# Patient Record
Sex: FEMALE | Race: BLACK OR AFRICAN AMERICAN | NOT HISPANIC OR LATINO | Employment: FULL TIME | URBAN - METROPOLITAN AREA
[De-identification: names, ages, dates, MRNs, and addresses within clinical notes are randomized per-mention and may not be internally consistent; named-entity substitution may affect disease eponyms.]

---

## 2024-02-13 LAB — BCS RECOMMENDATION EXT: NORMAL

## 2024-03-12 ENCOUNTER — OFFICE VISIT (OUTPATIENT)
Dept: OBSTETRICS AND GYNECOLOGY | Facility: CLINIC | Age: 39
End: 2024-03-12
Payer: COMMERCIAL

## 2024-03-12 VITALS
SYSTOLIC BLOOD PRESSURE: 128 MMHG | DIASTOLIC BLOOD PRESSURE: 88 MMHG | BODY MASS INDEX: 47.46 KG/M2 | HEIGHT: 64 IN | WEIGHT: 278 LBS

## 2024-03-12 DIAGNOSIS — N39.3 SUI (STRESS URINARY INCONTINENCE, FEMALE): ICD-10-CM

## 2024-03-12 DIAGNOSIS — Z01.419 ENCOUNTER FOR ANNUAL ROUTINE GYNECOLOGICAL EXAMINATION: Primary | ICD-10-CM

## 2024-03-12 DIAGNOSIS — Z12.4 CERVICAL CANCER SCREENING: ICD-10-CM

## 2024-03-12 PROCEDURE — 88175 CYTOPATH C/V AUTO FLUID REDO: CPT | Performed by: OBSTETRICS & GYNECOLOGY

## 2024-03-12 PROCEDURE — 99386 PREV VISIT NEW AGE 40-64: CPT | Mod: S$GLB,,, | Performed by: OBSTETRICS & GYNECOLOGY

## 2024-03-12 PROCEDURE — 3008F BODY MASS INDEX DOCD: CPT | Mod: CPTII,S$GLB,, | Performed by: OBSTETRICS & GYNECOLOGY

## 2024-03-12 PROCEDURE — 87624 HPV HI-RISK TYP POOLED RSLT: CPT | Performed by: OBSTETRICS & GYNECOLOGY

## 2024-03-12 PROCEDURE — 3074F SYST BP LT 130 MM HG: CPT | Mod: CPTII,S$GLB,, | Performed by: OBSTETRICS & GYNECOLOGY

## 2024-03-12 PROCEDURE — 3079F DIAST BP 80-89 MM HG: CPT | Mod: CPTII,S$GLB,, | Performed by: OBSTETRICS & GYNECOLOGY

## 2024-03-12 PROCEDURE — 1159F MED LIST DOCD IN RCRD: CPT | Mod: CPTII,S$GLB,, | Performed by: OBSTETRICS & GYNECOLOGY

## 2024-03-12 RX ORDER — DULAGLUTIDE 0.75 MG/.5ML
INJECTION, SOLUTION SUBCUTANEOUS
COMMUNITY
Start: 2024-02-22

## 2024-03-12 RX ORDER — ERGOCALCIFEROL 1.25 MG/1
50000 CAPSULE ORAL
COMMUNITY
Start: 2024-02-19

## 2024-03-12 RX ORDER — METOPROLOL SUCCINATE 25 MG/1
25 TABLET, EXTENDED RELEASE ORAL
COMMUNITY
Start: 2024-01-06

## 2024-03-12 RX ORDER — FERROUS SULFATE TAB 325 MG (65 MG ELEMENTAL FE) 325 (65 FE) MG
TAB ORAL 3 TIMES DAILY
COMMUNITY
Start: 2023-11-02

## 2024-03-12 RX ORDER — ATORVASTATIN CALCIUM 40 MG/1
40 TABLET, FILM COATED ORAL NIGHTLY
COMMUNITY
Start: 2024-02-18

## 2024-03-12 NOTE — PROGRESS NOTES
Annual Well Woman's Exam  History & Physical      SUBJECTIVE:     History of Present Illness:  Patient is a 38 y.o. female presents for her annual exam. Today, she is complaining of stress urinary incontinence.  She is seen Dr. Pham in [place] for this who recommended Lety Hue laser vaginal rejuvenation.  She was unsure if this would work, and her insurance did not cover it.  She was also referred to Dr. Durant, urologist, for mid urethral sling placement, but Dr. Durant does not accept her insurance.    Chief Complaint   Patient presents with    Well Woman       Review of patient's allergies indicates:  No Known Allergies    Current Outpatient Medications   Medication Sig Dispense Refill    atorvastatin (LIPITOR) 40 MG tablet Take 40 mg by mouth every evening.      ergocalciferol (ERGOCALCIFEROL) 50,000 unit Cap Take 50,000 Units by mouth every 7 days.      FEROSUL 325 mg (65 mg iron) Tab tablet Take by mouth 3 (three) times daily.      metoprolol succinate (TOPROL-XL) 25 MG 24 hr tablet Take 25 mg by mouth.      TRULICITY 0.75 mg/0.5 mL pen injector Inject into the skin.       No current facility-administered medications for this visit.     OB History          3    Para        Term                AB        Living   3         SAB        IAB        Ectopic        Multiple        Live Births                 Patient's last menstrual period was 2024.      Past Medical History:   Diagnosis Date    Diabetes mellitus     Hyperlipidemia     Hypertension      Past Surgical History:   Procedure Laterality Date    CHOLECYSTECTOMY      kidney stone removal       TUBAL LIGATION       Family History   Problem Relation Age of Onset    Diabetes Father     Cancer Father     Hypertension Mother      Social History     Tobacco Use    Smoking status: Never    Smokeless tobacco: Never   Substance Use Topics    Alcohol use: Not Currently    Drug use: Never        OBJECTIVE:     Vital Signs (Most Recent)  BP: 128/88  "(03/12/24 1303)  5' 4" (1.626 m)  126.1 kg (278 lb)     Physical Exam:  Physical Exam  Vitals reviewed.   Constitutional:       Appearance: Normal appearance. She is obese.   HENT:      Head: Normocephalic.   Pulmonary:      Effort: Pulmonary effort is normal.   Abdominal:      General: Abdomen is flat.      Palpations: Abdomen is soft.   Genitourinary:     General: Normal vulva.      Vagina: Normal.      Cervix: Normal.      Uterus: Normal.       Adnexa: Right adnexa normal and left adnexa normal.   Skin:     General: Skin is warm and dry.   Neurological:      General: No focal deficit present.      Mental Status: She is alert and oriented to person, place, and time.   Psychiatric:         Mood and Affect: Mood normal.         Behavior: Behavior normal.         ASSESSMENT/PLAN:       ICD-10-CM ICD-9-CM   1. Encounter for annual routine gynecological examination  Z01.419 V72.31   2. YUNIOR (stress urinary incontinence, female)  N39.3 625.6   3. Cervical cancer screening  Z12.4 V76.2         PLAN:    --Pap with HPV cotesting today  --Mammogram is up-to-date.  Last performed in October 2023.  --Contraception: s/p BTL  --Discussed possibility of placing a mid urethral sling today.  Patient is unsure if she would like to have this procedure performed.  --Follow up in the office if she desires sling for YUNIOR treatment or in 1 year for annual exam      Blanca Cast MD   Gynecology    [address] [facility]   [address]  [place] [address] [phone]           "

## 2024-03-19 LAB
FINAL PATHOLOGIC DIAGNOSIS: NORMAL
Lab: NORMAL

## 2024-03-20 LAB
HPV HR 12 DNA SPEC QL NAA+PROBE: POSITIVE
HPV16 AG SPEC QL: NEGATIVE
HPV18 DNA SPEC QL NAA+PROBE: NEGATIVE

## 2024-04-01 ENCOUNTER — TELEPHONE (OUTPATIENT)
Dept: OBSTETRICS AND GYNECOLOGY | Facility: CLINIC | Age: 39
End: 2024-04-01
Payer: COMMERCIAL

## 2024-04-01 NOTE — TELEPHONE ENCOUNTER
----- Message from Blanca Cast MD sent at 4/1/2024  9:49 AM CDT -----  Please let pt know that her Pap was normal, but she tested positive for HPV. She will need to repeat Pap and HPV testing in 1 year.   Her Pap also showed BV. Please call in Flagyl 500mg BID x 7 days.

## 2024-12-02 ENCOUNTER — OFFICE VISIT (OUTPATIENT)
Dept: FAMILY MEDICINE | Facility: CLINIC | Age: 39
End: 2024-12-02
Payer: COMMERCIAL

## 2024-12-02 VITALS
OXYGEN SATURATION: 99 % | BODY MASS INDEX: 48.08 KG/M2 | WEIGHT: 281.63 LBS | DIASTOLIC BLOOD PRESSURE: 89 MMHG | HEART RATE: 82 BPM | SYSTOLIC BLOOD PRESSURE: 105 MMHG | HEIGHT: 64 IN

## 2024-12-02 DIAGNOSIS — Z13.220 ENCOUNTER FOR SCREENING FOR LIPID DISORDER: ICD-10-CM

## 2024-12-02 DIAGNOSIS — E78.2 MIXED HYPERLIPIDEMIA: ICD-10-CM

## 2024-12-02 DIAGNOSIS — Z00.00 PREVENTATIVE HEALTH CARE: ICD-10-CM

## 2024-12-02 DIAGNOSIS — E11.9 DIABETES MELLITUS WITHOUT COMPLICATION: ICD-10-CM

## 2024-12-02 DIAGNOSIS — R00.2 PALPITATION: ICD-10-CM

## 2024-12-02 DIAGNOSIS — R73.9 ELEVATED BLOOD SUGAR: Primary | ICD-10-CM

## 2024-12-02 DIAGNOSIS — Z11.3 SCREEN FOR STD (SEXUALLY TRANSMITTED DISEASE): ICD-10-CM

## 2024-12-02 DIAGNOSIS — Z11.59 ENCOUNTER FOR HEPATITIS C SCREENING TEST FOR LOW RISK PATIENT: ICD-10-CM

## 2024-12-02 DIAGNOSIS — I10 ESSENTIAL HYPERTENSION, BENIGN: ICD-10-CM

## 2024-12-02 DIAGNOSIS — I10 BENIGN HYPERTENSION: ICD-10-CM

## 2024-12-02 DIAGNOSIS — D50.0 IRON DEFICIENCY ANEMIA DUE TO CHRONIC BLOOD LOSS: ICD-10-CM

## 2024-12-02 PROCEDURE — 3074F SYST BP LT 130 MM HG: CPT | Mod: CPTII,S$GLB,, | Performed by: INTERNAL MEDICINE

## 2024-12-02 PROCEDURE — 1160F RVW MEDS BY RX/DR IN RCRD: CPT | Mod: CPTII,S$GLB,, | Performed by: INTERNAL MEDICINE

## 2024-12-02 PROCEDURE — 99204 OFFICE O/P NEW MOD 45 MIN: CPT | Mod: S$GLB,,, | Performed by: INTERNAL MEDICINE

## 2024-12-02 PROCEDURE — 3044F HG A1C LEVEL LT 7.0%: CPT | Mod: CPTII,S$GLB,, | Performed by: INTERNAL MEDICINE

## 2024-12-02 PROCEDURE — G2211 COMPLEX E/M VISIT ADD ON: HCPCS | Mod: S$GLB,,, | Performed by: INTERNAL MEDICINE

## 2024-12-02 PROCEDURE — 1159F MED LIST DOCD IN RCRD: CPT | Mod: CPTII,S$GLB,, | Performed by: INTERNAL MEDICINE

## 2024-12-02 PROCEDURE — 3008F BODY MASS INDEX DOCD: CPT | Mod: CPTII,S$GLB,, | Performed by: INTERNAL MEDICINE

## 2024-12-02 PROCEDURE — 3079F DIAST BP 80-89 MM HG: CPT | Mod: CPTII,S$GLB,, | Performed by: INTERNAL MEDICINE

## 2024-12-02 RX ORDER — DULAGLUTIDE 1.5 MG/.5ML
1.5 INJECTION, SOLUTION SUBCUTANEOUS
Qty: 4 PEN | Refills: 11 | Status: SHIPPED | OUTPATIENT
Start: 2024-12-02 | End: 2025-12-02

## 2024-12-02 NOTE — ASSESSMENT & PLAN NOTE
Get Mammo report  Discussed healthy weight control and diet/exercise.   Educated patient on normal BMI range of 18.5 to 24.9  Advised to monitor nutrition to not exceed caloric needs to maintain a healthy weight and BMI.  Advised to engage in aerobic physical activity, to assist with maintaining a healthy weight and BMI.   Advised to follow up with dietary if indicated to address nutrition as needed to assist with reaching or maintaining a healthy weight and BMI.   Follow up : soon

## 2024-12-02 NOTE — PROGRESS NOTES
Subjective:       Patient ID: Oma Alarcon is a 39 y.o. female.    Chief Complaint: Establish Care (Patient is here to establish care. )      History of Present Illness    CHIEF COMPLAINT:  Oma presents for a follow-up visit to discuss recent lab results and medication management.    HPI:  Oma reports being pre-diabetic with a recent increase in fasting blood sugar from 79 mg/dL to 115 mg/dL in the morning. She is currently taking Terlizti for pre-diabetes management. Oma is taking cholesterol medication nightly, although she has good cholesterol levels. She wishes to discontinue all medications but is concerned about the potential risk of stroke if she stops the cholesterol medication. Oma reports a history of feeling jittery with tachycardia, for which a cardiologist prescribed metoprolol. This medication is taken daily for both heart rate control and blood pressure management. Oma reports seeing Isa Lazo, a nurse practitioner at [facility], as her PCP. She was referred to this current visit by someone named Rani. Oma denies being diabetic or having high cholesterol currently.    MEDICATIONS:  Oma is on Doprolol for blood pressure and Terlizti for pre-diabetes. She takes Vitamin T supplements weekly and iron pills 3 times daily. She is also on cholesterol medication nightly and Metoprolol daily for heart and blood pressure management.    MEDICAL HISTORY:  Oma has a history of pre-diabetes, hypercholesterolemia, and thyroid dysfunction.    TEST RESULTS:  Recent lab results show the patient's A1C at a pre-diabetic level. Her fasting blood sugar was 115 mg/dL, which is an increase from the previous 79 mg/dL. Kaileys cholesterol, thyroid test, and Vitamin D levels were all reported as good in recent tests.    SOCIAL HISTORY:  Oma works from home.         Review of Systems   Constitutional:  Positive for unexpected weight change. Negative for activity change  and fever.   Respiratory: Negative.     Cardiovascular: Negative.    Neurological: Negative.          Objective:      Physical Exam  Vitals and nursing note reviewed.   Constitutional:       Appearance: Normal appearance. She is obese.   Cardiovascular:      Rate and Rhythm: Normal rate and regular rhythm.   Pulmonary:      Effort: Pulmonary effort is normal.      Breath sounds: Normal breath sounds.   Musculoskeletal:      Cervical back: Normal range of motion and neck supple.   Neurological:      Mental Status: She is alert.         Assessment:       1. Elevated blood sugar  -     Hemoglobin A1C; Future; Expected date: 12/02/2024  -     Cancel: Glucose, Fasting; Future; Expected date: 12/02/2024    2. Encounter for screening for lipid disorder  -     Lipid Panel; Future; Expected date: 12/02/2024    3. Diabetes mellitus without complication  Overview:  A1c under 6%    Assessment & Plan:  Refer to diab educ  Diet , wt loss , exercise d/w patient  Inc trulicity    Orders:  -     dulaglutide (TRULICITY) 1.5 mg/0.5 mL pen injector; Inject 1.5 mg into the skin every 7 days.  Dispense: 4 pen ; Refill: 11  -     Ambulatory referral/consult to Diabetes Education; Future; Expected date: 12/09/2024    4. Encounter for hepatitis C screening test for low risk patient  -     Hepatitis C Antibody; Future; Expected date: 12/02/2024    5. Screen for STD (sexually transmitted disease)  -     HIV 1/2 Ag/Ab (4th Gen); Future; Expected date: 06/02/2025    6. Essential hypertension, benign  -     Comprehensive Metabolic Panel; Future; Expected date: 12/02/2024    7. Iron deficiency anemia due to chronic blood loss  -     CBC Auto Differential; Future; Expected date: 12/02/2024  -     Iron and TIBC; Future; Expected date: 12/02/2024    8. Mixed hyperlipidemia  Overview:  On lipitor    Assessment & Plan:  Hold it and monitor      9. Palpitation  Overview:  Stable    Assessment & Plan:  On B blocker      10. Benign  hypertension  Overview:  Stable    Assessment & Plan:  On B blocker  Diet , wt loss , exercise d/w patient        11. Preventative health care  Overview:  See below    Assessment & Plan:  Get Mammo report  Discussed healthy weight control and diet/exercise.   Educated patient on normal BMI range of 18.5 to 24.9  Advised to monitor nutrition to not exceed caloric needs to maintain a healthy weight and BMI.  Advised to engage in aerobic physical activity, to assist with maintaining a healthy weight and BMI.   Advised to follow up with dietary if indicated to address nutrition as needed to assist with reaching or maintaining a healthy weight and BMI.   Follow up : soon               Plan:       Assessment & Plan    Evaluated patient's pre-diabetic status based on recent A1C and fasting glucose results  Assessed appropriateness of metoprolol for both blood pressure control and heart rate regulation  Recognized inaccuracy of in-office blood pressure measurement, necessitating alternative method  Considered discontinuation of cholesterol medication due to good cholesterol levels    PREDIABETES:  - Explained pre-diabetic status and its implications.  - Continued Terlizti for pre-diabetes management.  - Ordered A1C test.  - Ordered repeat sugar tests in 3 months.    HYPERCHOLESTEROLEMIA:  - Discontinued cholesterol medication.  - Ordered repeat lipid panel in 1-2 months after discontinuation of cholesterol medication.  - Ordered repeat cholesterol tests in 3 months.    HYPERTENSION:  - Continued metoprolol daily for blood pressure and heart rate control.    ANEMIA:  - Continued iron pills 3 times daily.    VITAMIN DEFICIENCY:  - Continued vitamin T supplements weekly.    FOLLOW-UP:  - Follow up in 1-2 months for repeat lipid panel after discontinuation of cholesterol medication.  - Follow up in 3 months for repeat cholesterol and sugar tests.       Oma was seen today for establish care.    Diagnoses and all orders for  this visit:    Elevated blood sugar  -     Hemoglobin A1C; Future  -     Cancel: Glucose, Fasting; Future    Encounter for screening for lipid disorder  -     Lipid Panel; Future    Diabetes mellitus without complication  -     dulaglutide (TRULICITY) 1.5 mg/0.5 mL pen injector; Inject 1.5 mg into the skin every 7 days.  -     Ambulatory referral/consult to Diabetes Education; Future    Encounter for hepatitis C screening test for low risk patient  -     Hepatitis C Antibody; Future    Screen for STD (sexually transmitted disease)  -     HIV 1/2 Ag/Ab (4th Gen); Future    Essential hypertension, benign  -     Comprehensive Metabolic Panel; Future    Iron deficiency anemia due to chronic blood loss  -     CBC Auto Differential; Future  -     Iron and TIBC; Future    Mixed hyperlipidemia    Palpitation    Benign hypertension    Preventative health care            Visit today included increased complexity associated with the care of the episodic problem.   I addressed and managing the longitudinal care of the patient due to the serious and/or complex managed problem(s).  .

## 2024-12-02 NOTE — PATIENT INSTRUCTIONS
Take iron pill every other day    Trulicity 1.5mg weekly    Stop lipitor    Take vit D & metoprolol

## 2024-12-03 ENCOUNTER — PATIENT OUTREACH (OUTPATIENT)
Dept: ADMINISTRATIVE | Facility: HOSPITAL | Age: 39
End: 2024-12-03
Payer: COMMERCIAL

## 2024-12-03 ENCOUNTER — TELEPHONE (OUTPATIENT)
Dept: DIABETES | Facility: CLINIC | Age: 39
End: 2024-12-03
Payer: COMMERCIAL

## 2024-12-03 NOTE — LETTER
AUTHORIZATION FOR RELEASE OF   CONFIDENTIAL INFORMATION    Dear [facility] Medical Records,    We are seeing Oma Alarcon, date of birth 1985, in the clinic at [facility]. Umesh Kennedy MD is the patient's PCP. Oma Alarcon has an outstanding lab/procedure at the time we reviewed her chart. In order to help keep her health information updated, she has authorized us to request the following medical record(s):        ( X )  MAMMOGRAM                                      (  )  COLONOSCOPY      (  )  PAP SMEAR                                          (  )  OUTSIDE LAB RESULTS     (  )  DEXA SCAN                                          (  )  EYE EXAM            (  )  FOOT EXAM                                          (  )  ENTIRE RECORD     (  )  OUTSIDE IMMUNIZATIONS                 (  )  _______________         Please fax records to [facility], Gorgi-Mikhail, Magdy, MD at [phone]    Thanks so much and have a great day!    Risa Callahan LPN [place] [address] [address] [place] [address]  - [phone]   [phone]         Patient Name: Oma Alarcon  : 1985  Patient Phone #: [phone]

## 2024-12-03 NOTE — PROGRESS NOTES
Population Health Chart Review & Patient Outreach Details      Additional Pop Health Notes:               Updates Requested / Reviewed:      Updated Care Coordination Note         Health Maintenance Topics Overdue:      VB Score: 1     Mammogram                       Health Maintenance Topic(s) Outreach Outcomes & Actions Taken:    Breast Cancer Screening - Outreach Outcomes & Actions Taken  : External Records Requested & Care Team Updated if Applicable

## 2024-12-03 NOTE — LETTER
AUTHORIZATION FOR RELEASE OF   CONFIDENTIAL INFORMATION    Dear [facility] Medical Records,    We are seeing Oma Alarcon, date of birth 1985, in the clinic at [facility]. Umesh Kennedy MD is the patient's PCP. Oma Alarcon has an outstanding lab/procedure at the time we reviewed her chart. In order to help keep her health information updated, she has authorized us to request the following medical record(s):        ( X )  MAMMOGRAM                                      (  )  COLONOSCOPY      (  )  PAP SMEAR                                          (  )  OUTSIDE LAB RESULTS     (  )  DEXA SCAN                                          (  )  EYE EXAM            (  )  FOOT EXAM                                          (  )  ENTIRE RECORD     (  )  OUTSIDE IMMUNIZATIONS                 (  )  _______________         Please fax records to [facility], Gorgi-Mikhail, Magdy, MD at [phone]    Thanks so much and have a great day!    Risa Callahan LPN [place] [address] [address] [facility] [place] [address]  - [phone]   [phone]           Patient Name: Oma Alarcon  : 1985  Patient Phone #: [phone]

## 2024-12-04 ENCOUNTER — PATIENT OUTREACH (OUTPATIENT)
Dept: ADMINISTRATIVE | Facility: HOSPITAL | Age: 39
End: 2024-12-04
Payer: COMMERCIAL

## 2024-12-04 ENCOUNTER — CLINICAL SUPPORT (OUTPATIENT)
Dept: DIABETES | Facility: CLINIC | Age: 39
End: 2024-12-04
Payer: COMMERCIAL

## 2024-12-04 VITALS — WEIGHT: 283.75 LBS | HEIGHT: 64 IN | BODY MASS INDEX: 48.44 KG/M2

## 2024-12-04 DIAGNOSIS — E11.9 DIABETES MELLITUS WITHOUT COMPLICATION: ICD-10-CM

## 2024-12-04 PROCEDURE — G0108 DIAB MANAGE TRN  PER INDIV: HCPCS | Mod: S$GLB,,, | Performed by: DIETITIAN, REGISTERED

## 2024-12-06 NOTE — PROGRESS NOTES
"Diabetes Care Specialist Progress Note  Author: Jennifer Burk RD  Date: 12/6/2024    Intake    Program Intake  Reason for Diabetes Program Visit:: Initial Diabetes Assessment  Current diabetes risk level:: low    Current Diabetes Treatment: DM Injectables  DM Injectables Type/Dose: Trulicity 0.75 mg    Continuous Glucose Monitoring  Patient has CGM: No    Lab Results   Component Value Date    HGBA1C 5.9 (H) 11/13/2024       Weight: 128.7 kg (283 lb 11.7 oz)   Height: 5' 4" (162.6 cm)   Body mass index is 48.7 kg/m².    Lifestyle Coping Support & Clinical    Lifestyle/Coping/Support  Compared to other people your age, how would you rate your health?: Good  Does anyone in your family have diabetes or does anyone in your family support you in your diabetes care?: Dad and maternal grandmother have diabetes  How do you deal with stress/distress?: pray, isolate herself  Learning Barriers:: None  Culture or Gnosticism beliefs that may impact ability to access healthcare: No  Psychosocial/Coping Skills Assessment Completed: : Yes  Assessment indicates:: Adequate understanding  Area of need?: No    Problem Review  Active Comorbidities: Hypertension, Hyperlipidemia/Dyslipidemia, Obesity    Diabetes Self-Management Skills Assessment    Medication Skills Assessment  Patient is able to identify current diabetes medications, dosages, and appropriate timing of medications.: yes  Patient reports problems or concerns with current medication regimen.: yes  Medication regimen problems/concerns:: does not feel like regimen is working  Patient is  aware that some diabetes medications can cause low blood sugar?: Yes  Medication Skills Assessment Completed:: Yes  Assessment indicates:: Adequate understanding  Area of need?: No    Diabetes Disease Process/Treatment Options  Diabetes Type?: Other (Comment) (pre-diabetes)  If previous diabetes education, when/where:: recent diet education in November 2024  What are your goals for this education " session?: help losing weight and keep blood sugars down  Diabetes Disease Process/Treatment Options: Skills Assessment Completed: No  Deferred due to:: Time    Nutrition/Healthy Eating  Meal Plan 24 Hour Recall - Breakfast: chicken strip and biscuit, does not always eat breakfast  Meal Plan 24 Hour Recall - Lunch: large nogueira  Meal Plan 24 Hour Recall - Dinner: turkey wing and some dressing  Meal Plan 24 Hour Recall - Snack: popcorn, chips and dip, sweets  Meal Plan 24 Hour Recall - Beverage: flavored water, Coke Zero or Sprite Zero  Who shops/cooks?: patient and daughter both do grocery shopping, daughter mostly cooks  Challenges to healthy eating:: eating out, going to parties, snacking between meals and at night  Nutrition/Healthy Eating Skills Assessment Completed:: Yes  Assessment indicates:: Adequate understanding  Area of need?: Yes    Physical Activity/Exercise  Physical Activity/Exercise Skills Assessment Completed: : No  Deferred due to:: Time    Home Blood Glucose Monitoring  Home Blood Glucose Monitoring Skills Assessment Completed: : No  Deferred due to:: Time    Acute Complications  Acute Complications Skills Assessment Completed: : No  Deferred due to:: Time    Chronic Complications  Have you completed your annual diabetes maintenance labwork? : yes  Do you examine your feet daily?: no  Has your doctor examined your feet?: yes  Do you see a Dentist?: yes  Dentist date of last visit:: 2023  Do you see an eye doctor?: yes  Eye doctor date of last visit:: 2024  Chronic Complications Skills Assessment Completed: : No  Deferred due to:: Time      Assessment Summary and Plan    Based on today's diabetes care assessment, the following areas of need were identified:      Identified Areas of Need      Medication/Current Diabetes Treatment: No   Lifestyle Coping/Support: No   Nutrition/Healthy Eating: Yes- Care plan added        Today's interventions were provided through individual discussion, instruction, and  written materials were provided.      Patient verbalized understanding of instruction and written materials.  Pt was able to return back demonstration of instructions today. Patient understood key points, needs reinforcement and further instruction.     Diabetes Self-Management Care Plan:    Today's Diabetes Self-Management Care Plan was developed with Oma's input. Oma has agreed to work toward the following goal(s) to improve his/her overall diabetes control.      Care Plan: Diabetes Management   Updates made since 12/7/2023 12:00 AM        Problem: Healthy Eating         Goal: Eat 3 meals daily with 15-45g/1-3 servings of Carbohydrate per meal. Avoid skipping meals. Pair protein with carbs.    Start Date: 12/4/2024   Expected End Date: 2/26/2025   Priority: High   Barriers: No Barriers Identified   Note:    12/4/2024- Patient reports steadily gaining weight here lately. Has had success in the past losing weight with Mounjaro but gained it back when she was unable to get it anymore (insurance no longer covered it well). She also had success with a plant based, mostly vegan, diet. Encouraged patient to think back to that time and incorporate more fruits, veggies and whole grains/starches, but could add lean meat/protein to help with satiety and maintaining good muscle mass. Encouraged patient to fill up on non starchy veggies and to always pair a protein when eating carbs. Encouraged patient to limit eating out and consider meal prepping. She reports daughter likes to find and cook fun recipes so encouraged them to check out the Diabetes Food Hub. Handout on this, diabetes plate and carb serving sizes provided.        Task: Reviewed the sources and role of Carbohydrate, Protein, and Fat and how each nutrient impacts blood sugar. Completed 12/6/2024          Task: Explained how to count carbohydrates using the food label and the use of dry measuring cups for accurate carb counting. Completed 12/6/2024           Task: Review the importance of balancing carbohydrates with each meal using portion control techniques to count servings of carbohydrate and label reading to identify serving size and amount of total carbs per serving. Completed 12/6/2024        Task: Provided Sample plate method and reviewed the use of the plate to estimate amounts of carbohydrate per meal. Completed 12/6/2024          Follow Up Plan     Follow up in 12 weeks (on 2/26/2025) for continued DSME and progress towards goals set.    Today's care plan and follow up schedule was discussed with patient.  Oma verbalized understanding of the care plan, goals, and agrees to follow up plan.        The patient was encouraged to communicate with his/her health care provider/physician and care team regarding his/her condition(s) and treatment.  I provided the patient with my contact information today and encouraged to contact me via phone or [facility] Patient Portal as needed.     Length of Visit   Total Time: 60 Minutes

## 2024-12-10 ENCOUNTER — OFFICE VISIT (OUTPATIENT)
Dept: OBSTETRICS AND GYNECOLOGY | Facility: CLINIC | Age: 39
End: 2024-12-10
Payer: COMMERCIAL

## 2024-12-10 VITALS
DIASTOLIC BLOOD PRESSURE: 88 MMHG | SYSTOLIC BLOOD PRESSURE: 140 MMHG | BODY MASS INDEX: 48.65 KG/M2 | HEIGHT: 64 IN | WEIGHT: 285 LBS

## 2024-12-10 DIAGNOSIS — N39.3 SUI (STRESS URINARY INCONTINENCE, FEMALE): Primary | ICD-10-CM

## 2024-12-10 PROCEDURE — 99213 OFFICE O/P EST LOW 20 MIN: CPT | Mod: S$GLB,,, | Performed by: OBSTETRICS & GYNECOLOGY

## 2024-12-10 PROCEDURE — 3079F DIAST BP 80-89 MM HG: CPT | Mod: CPTII,S$GLB,, | Performed by: OBSTETRICS & GYNECOLOGY

## 2024-12-10 PROCEDURE — 3008F BODY MASS INDEX DOCD: CPT | Mod: CPTII,S$GLB,, | Performed by: OBSTETRICS & GYNECOLOGY

## 2024-12-10 PROCEDURE — 1159F MED LIST DOCD IN RCRD: CPT | Mod: CPTII,S$GLB,, | Performed by: OBSTETRICS & GYNECOLOGY

## 2024-12-10 PROCEDURE — 3077F SYST BP >= 140 MM HG: CPT | Mod: CPTII,S$GLB,, | Performed by: OBSTETRICS & GYNECOLOGY

## 2024-12-10 PROCEDURE — 3044F HG A1C LEVEL LT 7.0%: CPT | Mod: CPTII,S$GLB,, | Performed by: OBSTETRICS & GYNECOLOGY

## 2024-12-10 PROCEDURE — 99999 PR PBB SHADOW E&M-EST. PATIENT-LVL III: CPT | Mod: PBBFAC,,, | Performed by: OBSTETRICS & GYNECOLOGY

## 2024-12-10 RX ORDER — ATORVASTATIN CALCIUM 20 MG/1
20 TABLET, FILM COATED ORAL
COMMUNITY
Start: 2024-11-11

## 2024-12-10 NOTE — PROGRESS NOTES
"Gynecology Visit     Subjective:       Patient ID: Oma Alarcon is a 39 y.o. female.    Chief Complaint:  discuss results      History of Present Illness  38yo  female presents for follow up for YUNIOR. She would like to start pelvic floor physical therapy.     GYN & OB History  Patient's last menstrual period was 2024 (approximate).   Date of Last Pap: 3/19/2024    OB History    Para Term  AB Living   3         3   SAB IAB Ectopic Multiple Live Births                  # Outcome Date GA Lbr Chavez/2nd Weight Sex Type Anes PTL Lv   3             2             1                     BP (!) 140/88 (BP Location: Right arm, Patient Position: Sitting)   Ht 5' 4" (1.626 m)   Wt 129.3 kg (285 lb)   LMP 2024 (Approximate)   BMI 48.92 kg/m²     Objective:    Physical Exam:   Constitutional: She is oriented to person, place, and time. She appears well-developed and well-nourished.    HENT:   Head: Normocephalic and atraumatic.       Pulmonary/Chest: Effort normal.                      Neurological: She is alert and oriented to person, place, and time.     Psychiatric: She has a normal mood and affect.            Assessment:        1. YUNIOR (stress urinary incontinence, female)             Plan:      Referral sent to pelvic floor physical therapy.   Follow up in 3 months for annual exam.   Will be due for Pap testing with HPV. HPV pos on last Pap.     Blanca Cast MD, FACOG   Gynecology    [address] [address] [phone]      "

## 2025-01-29 ENCOUNTER — TELEPHONE (OUTPATIENT)
Dept: FAMILY MEDICINE | Facility: CLINIC | Age: 40
End: 2025-01-29
Payer: COMMERCIAL

## 2025-02-25 ENCOUNTER — TELEPHONE (OUTPATIENT)
Dept: DIABETES | Facility: CLINIC | Age: 40
End: 2025-02-25

## 2025-02-26 ENCOUNTER — NUTRITION (OUTPATIENT)
Dept: DIABETES | Facility: CLINIC | Age: 40
End: 2025-02-26

## 2025-02-26 VITALS — HEIGHT: 64 IN | WEIGHT: 284.06 LBS | BODY MASS INDEX: 48.5 KG/M2

## 2025-02-26 DIAGNOSIS — E11.9 DIABETES MELLITUS WITHOUT COMPLICATION: Primary | ICD-10-CM

## 2025-02-27 NOTE — PROGRESS NOTES
"Diabetes Care Specialist Follow-up Note  Author: Jennifer Burk RD  Date: 2/27/2025    Intake    Program Intake  Reason for Diabetes Program Visit:: Intervention  Type of Intervention:: Individual  Individual: Education  Education: Self-Management Skill Review, Nutrition and Meal Planning  In the last month, have you used the ER or been admitted to the hospital: No    Current Diabetes Treatment: DM Injectables  DM Injectables Type/Dose: just started on Ozempic 2 weeks ago 0.25 mg weekly- takes on Saturday         Lab Results   Component Value Date    HGBA1C 5.9 (H) 11/13/2024         Weight: 128.8 kg (284 lb 1 oz)   Height: 5' 4" (162.6 cm)   Body mass index is 48.76 kg/m².  No significant weight change since last visit on 12/4/2024      Physical activity/Exercise:   See care plan below    SMBG: home glucometer  Fasting, 100-130 (was 79 before here recently)        Diabetes Self-Management Skills Assessment      Nutrition/Healthy Eating  Meal Plan 24 Hour Recall - Breakfast: has not been eating breakfast  Meal Plan 24 Hour Recall - Lunch: a few bites of shrimp and fried rice (did not like it so threw it away)  Meal Plan 24 Hour Recall - Dinner: 2 smoked chicken thighs  Meal Plan 24 Hour Recall - Beverage: more water, zero Minute Maid      Home Blood Glucose Monitoring  Patient states that blood sugar is checked at home daily.: no  Home Blood Glucose Monitoring Skills Assessment Completed: : Yes  Assessment indicates:: Adequate understanding, Instruction Needed  Area of need?: Yes    Acute Complications  Have you ever had hypoglycemia (low BG 70 or less)?: no  Do you know the symptoms of low blood sugar and how to treat?: discussed symptoms and how to treat  Have you ever had hyperglycemia (high  or more)?: no   Do you know the symptoms of high blood sugar and how to treat: encouraged water and walk should her blood sugar become high  Have you ever had DKA?: no  Do you ever test for ketones?: no  Do you have a " sick day plan?: yes  Acute Complications Skills Assessment Completed: : Yes  Assessment indicates:: Adequate understanding  Area of need?: No    Chronic Complications  Reviewed health maintenance: yes  Chronic Complications Skills Assessment Completed: : Yes  Assessment indicates:: Adequate understanding  Area of need?: No      During today's follow-up visit,  the following areas required further assessment and content was provided/reviewed.    Based on today's diabetes care assessment, the following areas of need were identified:      Identified Areas of Need      Home Blood Glucose Monitoring: Yes- Encouraged patient to test a little more frequently to make sure she staying in good control. Discussed good times to test and goals.     Acute Complications: No    Chronic Complications: No       Today's interventions were provided through individual discussion, instruction, and written materials were provided.    Patient verbalized understanding of instruction and written materials.  Pt was able to return back demonstration of instructions today. Patient understood key points, needs reinforcement and further instruction.     Diabetes Self-Management Care Plan Review and Evaluation of Progress:    During today's follow-up [facility] Diabetes Self-Management Care Plan progress was reviewed and progress was evaluated including his/her input. Oma has agreed to continue his/her journey to improve/maintain overall diabetes control by continuing to set health goals. See care plan progress below.      Care Plan: Diabetes Management   Updates made since 2/28/2024 12:00 AM        Problem: Healthy Eating         Goal: Eat 3 meals daily with 15-45g/1-3 servings of Carbohydrate per meal. Avoid skipping meals. Pair protein with carbs.    Start Date: 12/4/2024   Expected End Date: 2/26/2025   Priority: High   Barriers: No Barriers Identified   Note:    12/4/2024- Patient reports steadily gaining weight here lately. Has had  success in the past losing weight with [employer]oun[employer] but gained it back when she was unable to get it anymore (insurance no longer covered it well). She also had success with a plant based, mostly vegan, diet. Encouraged patient to think back to that time and incorporate more fruits, veggies and whole grains/starches, but could add lean meat/protein to help with satiety and maintaining good muscle mass. Encouraged patient to fill up on non starchy veggies and to always pair a protein when eating carbs. Encouraged patient to limit eating out and consider meal prepping. She reports daughter likes to find and cook fun recipes so encouraged them to check out the Diabetes Food Hub. Handout on this, diabetes plate and carb serving sizes provided.     2/26/2025- Patient still not eating really anything for breakfast and sometimes not much throughout the day. Patient has heard before that she needs to eat enough in order to loss weight and I reiterated that and why. Patient discussed maybe drinking a protein shake and I encouraged this and discussed some good options that she could pair with fruit for a more substantial meal replacement. Reports she has been drinking more water which is great. Encouraged general healthy meals and to check out some recipes on the Diabetes Food Hub.         Problem: Physical Activity and Exercise         Goal: Patient agrees to increase physical activity to a goal of 2 times per week for 30 minutes.    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 She understands the importance of physical activity but works long hours most days of the week. However reports she will try to start walking around her apartment complex when she gets home at night.        Task: Discussed role of physical activity on reducing  insulin resistance and improvement in overall glycemic control. Completed 2/27/2025        Task: Discussed role of physical activity as it relates to weight loss Completed 2/27/2025        Task: Offered suggestions on how patient could increase their regular physical activity Completed 2/27/2025          Follow Up Plan     Follow up in 3 months (on 5/23/2025) for continued DSME.    Today's care plan and follow up schedule was discussed with patient.  Oma verbalized understanding of the care plan, goals, and agrees to follow up plan.        The patient was encouraged to communicate with his/her health care provider/physician and care team regarding his/her condition(s) and treatment.  I provided the patient with my contact information today and encouraged to contact me via phone or [facility] Patient Portal as needed.     Length of Visit   Total Time: 60 Minutes

## 2025-02-28 DIAGNOSIS — E11.9 TYPE 2 DIABETES MELLITUS WITHOUT COMPLICATION: ICD-10-CM

## 2025-02-28 DIAGNOSIS — Z12.31 OTHER SCREENING MAMMOGRAM: ICD-10-CM

## 2025-03-25 ENCOUNTER — OFFICE VISIT (OUTPATIENT)
Dept: FAMILY MEDICINE | Facility: CLINIC | Age: 40
End: 2025-03-25
Payer: COMMERCIAL

## 2025-03-25 VITALS
OXYGEN SATURATION: 99 % | DIASTOLIC BLOOD PRESSURE: 74 MMHG | RESPIRATION RATE: 18 BRPM | HEART RATE: 90 BPM | SYSTOLIC BLOOD PRESSURE: 132 MMHG | HEIGHT: 64 IN | BODY MASS INDEX: 47.8 KG/M2 | WEIGHT: 280 LBS

## 2025-03-25 DIAGNOSIS — Z11.1 SCREENING-PULMONARY TB: Primary | ICD-10-CM

## 2025-03-25 PROBLEM — E11.59 HYPERTENSION ASSOCIATED WITH DIABETES: Status: ACTIVE | Noted: 2024-12-02

## 2025-03-25 PROBLEM — E11.69 HYPERLIPIDEMIA ASSOCIATED WITH TYPE 2 DIABETES MELLITUS: Status: ACTIVE | Noted: 2024-12-02

## 2025-03-25 PROBLEM — E11.42 TYPE 2 DIABETES MELLITUS WITH POLYNEUROPATHY: Status: ACTIVE | Noted: 2024-12-02

## 2025-03-25 PROBLEM — I15.2 HYPERTENSION ASSOCIATED WITH DIABETES: Status: ACTIVE | Noted: 2024-12-02

## 2025-03-25 PROBLEM — E78.5 HYPERLIPIDEMIA ASSOCIATED WITH TYPE 2 DIABETES MELLITUS: Status: ACTIVE | Noted: 2024-12-02

## 2025-03-25 PROCEDURE — 3008F BODY MASS INDEX DOCD: CPT | Mod: CPTII,S$GLB,, | Performed by: STUDENT IN AN ORGANIZED HEALTH CARE EDUCATION/TRAINING PROGRAM

## 2025-03-25 PROCEDURE — 1159F MED LIST DOCD IN RCRD: CPT | Mod: CPTII,S$GLB,, | Performed by: STUDENT IN AN ORGANIZED HEALTH CARE EDUCATION/TRAINING PROGRAM

## 2025-03-25 PROCEDURE — 99214 OFFICE O/P EST MOD 30 MIN: CPT | Mod: S$GLB,,, | Performed by: STUDENT IN AN ORGANIZED HEALTH CARE EDUCATION/TRAINING PROGRAM

## 2025-03-25 PROCEDURE — 3078F DIAST BP <80 MM HG: CPT | Mod: CPTII,S$GLB,, | Performed by: STUDENT IN AN ORGANIZED HEALTH CARE EDUCATION/TRAINING PROGRAM

## 2025-03-25 PROCEDURE — 3075F SYST BP GE 130 - 139MM HG: CPT | Mod: CPTII,S$GLB,, | Performed by: STUDENT IN AN ORGANIZED HEALTH CARE EDUCATION/TRAINING PROGRAM

## 2025-03-25 PROCEDURE — G2211 COMPLEX E/M VISIT ADD ON: HCPCS | Mod: S$GLB,,, | Performed by: STUDENT IN AN ORGANIZED HEALTH CARE EDUCATION/TRAINING PROGRAM

## 2025-03-25 RX ORDER — SEMAGLUTIDE 0.68 MG/ML
INJECTION, SOLUTION SUBCUTANEOUS
COMMUNITY

## 2025-03-25 NOTE — PROGRESS NOTES
"  [facility]  [address], [address]  [place] [address]    Subjective     Patient ID: Oma Alarcon is a 39 y.o. female who comes to the clinic for a follow up visit.    Chief Complaint: Annual Exam (Follow up)    Type 2 Diabetes - takes Ozempic 0.5 mg weekly    Hyperlipidemia - takes Lipitor 20 mg daily    ROS negative unless stated above       Objective     Vitals:    03/25/25 1335   BP: 132/74   BP Location: Right arm   Patient Position: Sitting   Pulse: 90   Resp: 18   SpO2: 99%   Weight: 127 kg (280 lb)   Height: 5' 4" (1.626 m)        Wt Readings from Last 3 Encounters:   03/25/25 1335 127 kg (280 lb)   02/26/25 1416 128.8 kg (284 lb 1 oz)   12/10/24 1314 129.3 kg (285 lb)        Physical Exam  Constitutional:       General: She is not in acute distress.     Appearance: Normal appearance. She is obese. She is not ill-appearing.   HENT:      Head: Normocephalic and atraumatic.      Mouth/Throat:      Mouth: Mucous membranes are moist.   Eyes:      Extraocular Movements: Extraocular movements intact.      Pupils: Pupils are equal, round, and reactive to light.   Cardiovascular:      Rate and Rhythm: Normal rate.   Pulmonary:      Effort: Pulmonary effort is normal. No respiratory distress.   Musculoskeletal:         General: Normal range of motion.      Cervical back: Normal range of motion and neck supple.   Skin:     General: Skin is warm and dry.   Neurological:      General: No focal deficit present.      Mental Status: She is alert and oriented to person, place, and time. Mental status is at baseline.   Psychiatric:         Mood and Affect: Mood normal.         Behavior: Behavior normal.         Thought Content: Thought content normal.         Current Outpatient Medications   Medication Instructions    atorvastatin (LIPITOR) 20 mg    blood sugar diagnostic (ACCUTREND GLUCOSE TEST STRIPS MISC) Glucose test strips, See Instructions, Check " blood sugar daily prior to breakfast, # 100 EA, 0 Refill(s), Pharmacy: [place] [facility] [id number], 167.5, cm, 25 11:01:00 CST, Height/Length Measured, 130.8, kg, 25 11:01:00 CST, Weight Dosing    ergocalciferol (ERGOCALCIFEROL) 50,000 Units, Every 7 days    FEROSUL 325 mg (65 mg iron) Tab tablet 3 times daily    metoprolol succinate (TOPROL-XL) 25 mg    OZEMPIC 0.25 mg or 0.5 mg (2 mg/3 mL) pen injector SMARTSI.25 Milligram(s) Once a Week    TRULICITY 1.5 mg, Subcutaneous, Every 7 days           Assessment and Plan     1. Screening-pulmonary TB  -     Quantiferon Gold TB; Future; Expected date: 2025        Here for follow up.    For type 2 diabetes, continue Ozempic    For hypertension, continue metoprolol    Checking TB test    The visit today included increased complexity associated with the care of an episodic problem, namely T2DM, that was addressed and managed via longitudinal care.    RTC w/ PCP at regularly scheduled visit        I encouraged the patient to take all medications as prescribed and to keep follow up appointments with their providers. ED precautions given for more concerning issues. Questions were invited and answered. Patient stated they had no other concerns. Follow up sooner if needed.     Isaiah Santos MD  2025 1:40 PM

## 2025-03-26 PROCEDURE — 86480 TB TEST CELL IMMUN MEASURE: CPT | Performed by: STUDENT IN AN ORGANIZED HEALTH CARE EDUCATION/TRAINING PROGRAM

## 2025-03-31 ENCOUNTER — TELEPHONE (OUTPATIENT)
Dept: FAMILY MEDICINE | Facility: CLINIC | Age: 40
End: 2025-03-31
Payer: COMMERCIAL

## 2025-03-31 ENCOUNTER — RESULTS FOLLOW-UP (OUTPATIENT)
Dept: FAMILY MEDICINE | Facility: CLINIC | Age: 40
End: 2025-03-31

## 2025-03-31 DIAGNOSIS — Z11.3 SCREEN FOR STD (SEXUALLY TRANSMITTED DISEASE): ICD-10-CM

## 2025-03-31 DIAGNOSIS — Z11.59 ENCOUNTER FOR HEPATITIS C SCREENING TEST FOR LOW RISK PATIENT: ICD-10-CM

## 2025-03-31 DIAGNOSIS — Z12.31 OTHER SCREENING MAMMOGRAM: Primary | ICD-10-CM

## 2025-03-31 NOTE — PROGRESS NOTES
Please tell the patient that her quant gold is negative.  She is free to come  the paperwork at the .  Thank you

## 2025-03-31 NOTE — TELEPHONE ENCOUNTER
----- Message from Isaiah Santos MD sent at 3/31/2025  1:05 PM CDT -----  Please tell the patient that her quant gold is negative.  She is free to come  the paperwork at the .  Thank you  ----- Message -----  From: Lab, Background User  Sent: 3/31/2025   1:00 PM CDT  To: Isaiah Santos MD

## 2025-04-03 ENCOUNTER — TELEPHONE (OUTPATIENT)
Dept: FAMILY MEDICINE | Facility: CLINIC | Age: 40
End: 2025-04-03
Payer: COMMERCIAL

## 2025-04-03 NOTE — TELEPHONE ENCOUNTER
"Contacted pt for mammo and eye screening, client states: "I have my appt for may and June already scheduled with an outside provider, also, [facility] is not my PCP, my PCP is with [facility]". Dr. Parekh notified.  "

## 2025-04-07 ENCOUNTER — TELEPHONE (OUTPATIENT)
Dept: FAMILY MEDICINE | Facility: CLINIC | Age: 40
End: 2025-04-07
Payer: COMMERCIAL

## 2025-04-07 NOTE — TELEPHONE ENCOUNTER
"Fatmata Barnes, Patient Care Assistant  MP    4/3/25  8:44 AM  Note  Contacted pt for mammo and eye screening, client states: "I have my appt for may and June already scheduled with an outside provider, also, [facility] Bateman is not my PCP, my PCP is with [facility]". Dr. Parekh notified.        "

## 2025-04-07 NOTE — TELEPHONE ENCOUNTER
----- Message from Umesh Kennedy MD sent at 3/31/2025  6:01 PM CDT -----  Regarding: F/u  For a routine follow-up visit thank you

## 2025-04-25 ENCOUNTER — TELEPHONE (OUTPATIENT)
Dept: FAMILY MEDICINE | Facility: CLINIC | Age: 40
End: 2025-04-25
Payer: COMMERCIAL

## 2025-04-25 NOTE — TELEPHONE ENCOUNTER
----- Message from Umesh Kennedy MD sent at 3/31/2025  6:02 PM CDT -----  Regarding: Mammo & Eye exam  Please help the patient to schedule a mammogramIf she can not go [place] ,  [facility] please ask her to get a diabetic eye exam at any super [facility] if needed or [facility] thank you

## 2025-05-01 ENCOUNTER — OFFICE VISIT (OUTPATIENT)
Dept: FAMILY MEDICINE | Facility: CLINIC | Age: 40
End: 2025-05-01
Payer: COMMERCIAL

## 2025-05-01 VITALS
BODY MASS INDEX: 47.8 KG/M2 | SYSTOLIC BLOOD PRESSURE: 134 MMHG | HEIGHT: 64 IN | OXYGEN SATURATION: 98 % | WEIGHT: 280 LBS | DIASTOLIC BLOOD PRESSURE: 92 MMHG

## 2025-05-01 DIAGNOSIS — I10 ESSENTIAL HYPERTENSION, BENIGN: Primary | ICD-10-CM

## 2025-05-01 DIAGNOSIS — N20.0 NEPHROLITHIASIS: ICD-10-CM

## 2025-05-01 DIAGNOSIS — Z13.220 ENCOUNTER FOR SCREENING FOR LIPID DISORDER: ICD-10-CM

## 2025-05-01 DIAGNOSIS — R53.83 FATIGUE, UNSPECIFIED TYPE: ICD-10-CM

## 2025-05-01 PROCEDURE — 82043 UR ALBUMIN QUANTITATIVE: CPT | Performed by: INTERNAL MEDICINE

## 2025-05-01 RX ORDER — MELOXICAM 15 MG/1
15 TABLET ORAL DAILY
Qty: 30 TABLET | Refills: 0 | Status: SHIPPED | OUTPATIENT
Start: 2025-05-01 | End: 2025-05-31

## 2025-05-01 RX ORDER — METOPROLOL SUCCINATE 25 MG/1
25 TABLET, EXTENDED RELEASE ORAL DAILY
COMMUNITY

## 2025-05-01 RX ORDER — FERROUS SULFATE 325(65) MG
325 TABLET ORAL EVERY OTHER DAY
Qty: 45 TABLET | Refills: 1 | Status: SHIPPED | OUTPATIENT
Start: 2025-05-01 | End: 2025-10-28

## 2025-05-01 NOTE — PROGRESS NOTES
Subjective:       Patient ID: Oma Alarcon is a 39 y.o. female.    Chief Complaint: Health Maintenance      History of Present Illness    CHIEF COMPLAINT:  Oma presents with abdominal pain, back pain, and left-sided pain.    HPI:  Oma reports pain in multiple areas of her body, including throughout her abdomen area, back, and entire left side. 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    Oma initially considered kidney stones as a possible cause due to her history of surgically removed kidney stones. She reports pain when urinating, leading her to consider a kidney infection. She was recently evaluated at an urgent care facility due to symptoms and requested an X-ray, which was not performed.    Oma describes abdominal pain that occurs with inhalation.    Regarding her medical history, patient was told she was pre-diabetic, leading to her current medication regimen. She recently started Ozempic at 0.25 mg, prescribed by Miss Dixon. Oma reports her weight has increased from 270 to 280 lbs since starting the treatment, without significant weight loss.    Oma also reports heavy periods, which may be related to her iron supplementation.    Oma is unsure if there is blood in her urine due to her menstrual cycle.    MEDICATIONS:  Oma is on Metoprolol 25 mg daily for blood pressure management. She is taking an iron supplement daily or every other day for heavy periods, and Vitamin D once weekly. For pre-diabetes and weight loss, she has started Ozempic 0.25 mg. Oma discontinued Trulicity and switched to Ozempic for pre-diabetes management. Her iron supplement dosage was changed from 3 times daily to daily or every other day due to constipation.    MEDICAL HISTORY:  Oma has a history of pre-diabetes and kidney stones. She had kidney stones removed in the past. Oma experiences heavy  periods.    SURGICAL HISTORY:  Oma underwent kidney stone removal at [facility], though the specific date was not provided.    IMAGING:  Oma had an X-ray ordered at urgent care in the past, but it was not performed.    SOCIAL HISTORY:  Occupation: Home health         Review of Systems   Constitutional:  Negative for activity change, fever and unexpected weight change.   Respiratory: Negative.     Cardiovascular: Negative.    Musculoskeletal:  Positive for back pain.   Neurological: Negative.          Objective:      Physical Exam  Vitals and nursing note reviewed.   Constitutional:       Appearance: Normal appearance. She is obese.   Cardiovascular:      Rate and Rhythm: Normal rate and regular rhythm.   Pulmonary:      Effort: Pulmonary effort is normal.      Breath sounds: Normal breath sounds.   Abdominal:      General: There is no distension.      Palpations: There is no mass.      Tenderness: There is no abdominal tenderness. There is no guarding or rebound.      Hernia: No hernia is present.   Musculoskeletal:      Cervical back: Normal range of motion and neck supple.   Neurological:      Mental Status: She is alert.         Assessment:       1. Benign prostatic hyperplasia, unspecified whether lower urinary tract symptoms present  -     Comprehensive Metabolic Panel; Future; Expected date: 05/01/2025  -     Hemoglobin A1C; Future; Expected date: 05/01/2025  -     Lipid Panel; Future; Expected date: 05/01/2025  -     Microalbumin/Creatinine Ratio, Urine; Future; Expected date: 05/01/2025    2. Essential hypertension, benign  -     Comprehensive Metabolic Panel; Future; Expected date: 05/01/2025  -     Hemoglobin A1C; Future; Expected date: 05/01/2025  -     Lipid Panel; Future; Expected date: 05/01/2025  -     Microalbumin/Creatinine Ratio, Urine; Future; Expected date: 05/01/2025    3. Encounter for screening for lipid disorder  -     Comprehensive Metabolic Panel; Future; Expected date:  05/01/2025  -     Hemoglobin A1C; Future; Expected date: 05/01/2025  -     Lipid Panel; Future; Expected date: 05/01/2025  -     Microalbumin/Creatinine Ratio, Urine; Future; Expected date: 05/01/2025    4. Fatigue, unspecified type  -     TSH; Future; Expected date: 05/01/2025    5. Nephrolithiasis  -     US Retroperitoneal Complete; Future; Expected date: 05/01/2025  -     POCT Urinalysis(Instrument)    Other orders  -     ferrous sulfate (IRON, FERROUS SULFATE,) 325 mg (65 mg iron) Tab tablet; Take 1 tablet (325 mg total) by mouth every other day.  Dispense: 45 tablet; Refill: 1  -     meloxicam (MOBIC) 15 MG tablet; Take 1 tablet (15 mg total) by mouth once daily.  Dispense: 30 tablet; Refill: 0           Plan:       Assessment & Plan    R10.84 Generalized abdominal pain  M54.9 Dorsalgia, unspecified  R52 Pain, unspecified  R32 Unspecified urinary incontinence  R73.03 Prediabetes  N92.0 Excessive and frequent menstruation with regular cycle  D50.9 Iron deficiency anemia, unspecified  R63.5 Abnormal weight gain    IMPRESSION:  - Considered kidney stone as potential cause of back and abdominal pain, given history of kidney stones and current symptoms.  - Evaluated current medication regimen, including Ozempic for diabetes management and iron supplementation for anemia.  - Plan to assess A1C level to determine effectiveness of current diabetes management.    ABDOMINAL PAIN:  - Oma reports abdominal pain area, specifically under the right side and throughout the abdomen, including pain when inhaling.  - Evaluated generalized abdominal pain, possibly related to nephrolithiasis.  - Discussed potential etiologies with patient.  - Ordered imaging studies of the abdominal area to evaluate for nephrolithiasis.    BACK PAIN:  - Oma reports back pain, specifically in the left side.  - Evaluated back pain as possibly related to nephrolithiasis.  - Discussed location of pain and potential causes.  - Ordered imaging  studies for back area to further evaluate.    URINARY SYMPTOMS:  - Oma reports uncertainty about hematuria and dysuria.  - Evaluated for possible pyelonephritis or nephrolithiasis as causes of urinary symptoms.  - Discussed these potential etiologies with patient.  - Ordered urinalysis to evaluate for nephrolithiasis or infection.    PREDIABETES:  - Oma is taking Ozempic for prediabetes with no improvement in weight reduction.  - Evaluated prediabetic status and medication efficacy.  - Discussed current status and medication effectiveness, considering discontinuation of Ozempic.  - Ordered CMP including A1C to evaluate diabetes status and medication effectiveness.    MENORRHAGIA:  - Oma reports menorrhagia.  - Discussed condition and recommended iron supplementation every other day to manage symptoms.    IRON DEFICIENCY ANEMIA:  - Oma takes iron supplements daily or every other day due to constipation.  - Discussed supplementation frequency and decreased to daily or once every other day to manage constipation while treating anemia.    WEIGHT GAIN:  - Oma reports weight gain despite taking Ozempic, currently at 280 lbs.  - Evaluated and discussed weight gain despite therapy.  - Ordered A1C and other labs to evaluate diabetes status and medication effectiveness.    FOLLOW-UP:  - Will follow up for immediate imaging if patient is fasting.       Oma was seen today for health maintenance.    Diagnoses and all orders for this visit:    Benign prostatic hyperplasia, unspecified whether lower urinary tract symptoms present  -     Comprehensive Metabolic Panel; Future  -     Hemoglobin A1C; Future  -     Lipid Panel; Future  -     Microalbumin/Creatinine Ratio, Urine; Future    Essential hypertension, benign  -     Comprehensive Metabolic Panel; Future  -     Hemoglobin A1C; Future  -     Lipid Panel; Future  -     Microalbumin/Creatinine Ratio, Urine; Future    Encounter for screening for lipid  disorder  -     Comprehensive Metabolic Panel; Future  -     Hemoglobin A1C; Future  -     Lipid Panel; Future  -     Microalbumin/Creatinine Ratio, Urine; Future    Fatigue, unspecified type  -     TSH; Future    Nephrolithiasis  -     US Retroperitoneal Complete; Future  -     POCT Urinalysis(Instrument)    Other orders  -     ferrous sulfate (IRON, FERROUS SULFATE,) 325 mg (65 mg iron) Tab tablet; Take 1 tablet (325 mg total) by mouth every other day.  -     meloxicam (MOBIC) 15 MG tablet; Take 1 tablet (15 mg total) by mouth once daily.            Visit today included increased complexity associated with the care of the episodic problem.   I addressed and managing the longitudinal care of the patient due to the serious and/or complex managed problem(s).  .

## 2025-06-03 ENCOUNTER — TELEPHONE (OUTPATIENT)
Dept: FAMILY MEDICINE | Facility: CLINIC | Age: 40
End: 2025-06-03
Payer: COMMERCIAL

## 2025-07-07 ENCOUNTER — OFFICE VISIT (OUTPATIENT)
Dept: OBSTETRICS AND GYNECOLOGY | Facility: CLINIC | Age: 40
End: 2025-07-07
Payer: COMMERCIAL

## 2025-07-07 VITALS
DIASTOLIC BLOOD PRESSURE: 92 MMHG | HEIGHT: 64 IN | BODY MASS INDEX: 47.12 KG/M2 | WEIGHT: 276 LBS | SYSTOLIC BLOOD PRESSURE: 138 MMHG

## 2025-07-07 DIAGNOSIS — Z01.419 ENCOUNTER FOR ANNUAL ROUTINE GYNECOLOGICAL EXAMINATION: Primary | ICD-10-CM

## 2025-07-07 PROCEDURE — 3066F NEPHROPATHY DOC TX: CPT | Mod: CPTII,S$GLB,, | Performed by: OBSTETRICS & GYNECOLOGY

## 2025-07-07 PROCEDURE — 1159F MED LIST DOCD IN RCRD: CPT | Mod: CPTII,S$GLB,, | Performed by: OBSTETRICS & GYNECOLOGY

## 2025-07-07 PROCEDURE — 88175 CYTOPATH C/V AUTO FLUID REDO: CPT | Mod: TC | Performed by: OBSTETRICS & GYNECOLOGY

## 2025-07-07 PROCEDURE — 3061F NEG MICROALBUMINURIA REV: CPT | Mod: CPTII,S$GLB,, | Performed by: OBSTETRICS & GYNECOLOGY

## 2025-07-07 PROCEDURE — 3080F DIAST BP >= 90 MM HG: CPT | Mod: CPTII,S$GLB,, | Performed by: OBSTETRICS & GYNECOLOGY

## 2025-07-07 PROCEDURE — 99999 PR PBB SHADOW E&M-EST. PATIENT-LVL III: CPT | Mod: PBBFAC,,, | Performed by: OBSTETRICS & GYNECOLOGY

## 2025-07-07 PROCEDURE — 3008F BODY MASS INDEX DOCD: CPT | Mod: CPTII,S$GLB,, | Performed by: OBSTETRICS & GYNECOLOGY

## 2025-07-07 PROCEDURE — 3075F SYST BP GE 130 - 139MM HG: CPT | Mod: CPTII,S$GLB,, | Performed by: OBSTETRICS & GYNECOLOGY

## 2025-07-07 PROCEDURE — 99396 PREV VISIT EST AGE 40-64: CPT | Mod: S$GLB,,, | Performed by: OBSTETRICS & GYNECOLOGY

## 2025-07-07 PROCEDURE — 87624 HPV HI-RISK TYP POOLED RSLT: CPT | Performed by: OBSTETRICS & GYNECOLOGY

## 2025-07-07 NOTE — PROGRESS NOTES
"Annual Well Woman's Exam  History & Physical      SUBJECTIVE:     History of Present Illness:  Patient is a 39 y.o. female presents for her annual exam. She has no complaints today.     Chief Complaint   Patient presents with    Well Woman       Review of patient's allergies indicates:  No Known Allergies    Current Medications[1]  OB History          3    Para        Term                AB        Living   3         SAB        IAB        Ectopic        Multiple        Live Births                 Patient's last menstrual period was 2025 (exact date).      Past Medical History:   Diagnosis Date    Diabetes mellitus     Hyperlipidemia     Hypertension      Past Surgical History:   Procedure Laterality Date    CHOLECYSTECTOMY      kidney stone removal       TUBAL LIGATION       Family History   Problem Relation Name Age of Onset    Diabetes Father      Cancer Father      Hypertension Mother       Social History[2]     OBJECTIVE:     Vital Signs (Most Recent)  BP: (!) 138/92 (25 1344)  5' 4" (1.626 m)  125.2 kg (276 lb)     Physical Exam:  Physical Exam  Vitals reviewed.   Constitutional:       Appearance: Normal appearance.   HENT:      Head: Normocephalic.   Neck:      Thyroid: No thyroid mass, thyromegaly or thyroid tenderness.   Pulmonary:      Effort: Pulmonary effort is normal.   Chest:   Breasts:     Right: Normal.      Left: Normal.   Abdominal:      General: Abdomen is flat.      Palpations: Abdomen is soft.   Genitourinary:     General: Normal vulva.      Vagina: Normal.      Cervix: Normal.      Uterus: Normal.       Adnexa: Right adnexa normal and left adnexa normal.   Lymphadenopathy:      Upper Body:      Right upper body: No axillary adenopathy.      Left upper body: No axillary adenopathy.   Skin:     General: Skin is warm and dry.   Neurological:      General: No focal deficit present.      Mental Status: She is alert and oriented to person, place, and time.   Psychiatric:       "   Mood and Affect: Mood normal.         Behavior: Behavior normal.         ASSESSMENT/PLAN:       ICD-10-CM ICD-9-CM   1. Encounter for annual routine gynecological examination  Z01.419 V72.31       PLAN:    Pap with HPV cotesting today  Mammogram performed at [facility] several months ago  Follow up in 1 year for annual exam or sooner as needed    Blanca Cast MD, FACOG   Gynecology    [address] [address]    [phone]                  [1]   Current Outpatient Medications   Medication Sig Dispense Refill    atorvastatin (LIPITOR) 20 MG tablet Take 20 mg by mouth.      blood sugar diagnostic (ACCUTREND GLUCOSE TEST STRIPS MISC) Glucose test strips, See Instructions, Check blood sugar daily prior to breakfast, # 100 EA, 0 Refill(s), Pharmacy: [facility] [id number], 167.5, cm, 25 11:01:00 CST, Height/Length Measured, 130.8, kg, 25 11:01:00 CST, Weight Dosing      ferrous sulfate (IRON, FERROUS SULFATE,) 325 mg (65 mg iron) Tab tablet Take 1 tablet (325 mg total) by mouth every other day. 45 tablet 1    metoprolol succinate (TOPROL-XL) 25 MG 24 hr tablet Take 25 mg by mouth once daily.      OZEMPIC 0.25 mg or 0.5 mg (2 mg/3 mL) pen injector SMARTSI.25 Milligram(s) Once a Week       No current facility-administered medications for this visit.   [2]   Social History  Tobacco Use    Smoking status: Never     Passive exposure: Never    Smokeless tobacco: Never   Substance Use Topics    Alcohol use: Not Currently    Drug use: Never

## 2025-07-10 LAB
INSULIN SERPL-ACNC: NORMAL U[IU]/ML
LAB AP BETHESDA CATEGORY: NORMAL
LAB AP CLINICAL FINDINGS: NORMAL
LAB AP CONTRACEPTIVES: NORMAL
LAB AP LMP DATE: NORMAL
LAB AP OCHS PAP SPECIMEN ADEQUACY: NORMAL
LAB AP OHS PAP INTERPRETATION: NORMAL
LAB AP PAP DISCLAIMER COMMENTS: NORMAL
LAB AP PAP ESTROGEN REPLACEMENT THERAPY: NORMAL
LAB AP PAP PMP: NORMAL
LAB AP PAP PREVIOUS BX: NORMAL
LAB AP PAP PRIOR TREATMENT: NORMAL
LAB AP PERFORMING LOCATION(S): NORMAL